# Patient Record
Sex: MALE | Race: WHITE | NOT HISPANIC OR LATINO | Employment: PART TIME | URBAN - METROPOLITAN AREA
[De-identification: names, ages, dates, MRNs, and addresses within clinical notes are randomized per-mention and may not be internally consistent; named-entity substitution may affect disease eponyms.]

---

## 2023-01-09 ENCOUNTER — OFFICE VISIT (OUTPATIENT)
Dept: PHYSICAL THERAPY | Facility: MEDICAL CENTER | Age: 23
End: 2023-01-09

## 2023-01-09 DIAGNOSIS — M54.6 THORACIC BACK PAIN, UNSPECIFIED BACK PAIN LATERALITY, UNSPECIFIED CHRONICITY: Primary | ICD-10-CM

## 2023-01-09 NOTE — PROGRESS NOTES
PT Evaluation     Today's date: 2023  Patient name: Niurka Forrest  : 2000  MRN: [id number]  Referring provider: Mina Frazier PT  Dx:   Encounter Diagnosis     ICD-10-CM    1  Thoracic back pain, unspecified back pain laterality, unspecified chronicity  M54 6                      Assessment  Assessment details: Pt is a pleasant 24 yo male presenting to physical therapy with thoracic pain  Pt would benefit from skilled PT to address current impairments and return pt to pre-morbid function  We had a long discussion about the patients non-orthopedic issues  He is going to try to work on improving his diet and hydration with electrolytes  If he continues feel heart palpitations or his heart is racing he is going to contact his PCP  Understanding of Dx/Px/POC: good   Prognosis: good    Goals  Impairment Goals  - Pt I with initial HEP in 1-2 visits  - Improve ROM equal to contralateral side in 4 weeks    Functional Goals  - Increase FOTO to at least 85 in 4 weeks  - Patient will be independent with comprehensive HEP in 4 weeks  - Patient will be able to return to pre-morbid activity level with min to no difficulty or discomfort in 4 weeks        Plan  Patient would benefit from: skilled physical therapy  Other planned modality interventions: Modalities prn  Planned therapy interventions: manual therapy, neuromuscular re-education, patient education, therapeutic activities, therapeutic exercise, stretching and strengthening  Frequency: 2x week  Duration in weeks: 4  Treatment plan discussed with: family and patient        Subjective Evaluation    History of Present Illness  Mechanism of injury: Pt with previous history of episodes of his heart racing when he was younger  He has had a halter monitor multiple times, but nothing ever registered  The two weeks ago he started to have some chest pain/tightness and his heart felt like it was fluttering    He went to the ER and he had a EKG, which didn't show anything  Pt states that he has not felt his heart racing, but he has felt his heart fluttering  Shortly after he had the chest pain, he started to have some thoracic pain  He will sometimes feel the thoracic pain with movement  Pain  Current pain ratin  At best pain ratin  At worst pain rating: 3    Social Support    Employment status: working  Hand dominance: right    Treatments  No previous or current treatments  Patient Goals  Patient goals for therapy: decreased pain          Objective     Concurrent Complaints  Positive for difficulty breathing   Negative for night pain, disturbed sleep, dizziness, faints, headaches, nausea/motion sickness, trouble swallowing and shortness of breath    Additional Special Questions  + occasional lump in the throat     Postural Observations    Additional Postural Observation Details  + scapular protraction, anterior tilting and winging B    Cervical/Thoracic Screen   Cervical range of motion within normal limits  Thoracic range of motion within normal limits  Thoracic range of motion within normal limits with the following exceptions: Decreased L rotation    Neurological Testing     Sensation     Shoulder   Left Shoulder   Intact: light touch    Right Shoulder   Intact: light touch    Active Range of Motion   Left Shoulder   Normal active range of motion    Right Shoulder   Normal active range of motion    Joint Play     Hypermobile: T3, T4, T5, T6 and T7   T3 comments: L>R  T4 comments: L>R  T5 comments: L>R  T6 comments: L>R  T7 comments: L>R    Strength/Myotome Testing     Left Shoulder     Planes of Motion   Flexion: 5   Abduction: 5   External rotation at 0°: 5   Internal rotation at 0°: 5     Right Shoulder     Planes of Motion   Flexion: 5   Abduction: 5   External rotation at 0°: 5   Internal rotation at 0°: 5     Additional Strength Details  Scaption and empty can: 5/5 B    Elbow flexion and extension: 5/5 B    Tests     Left Shoulder   Negative scapular relocation  Right Shoulder   Negative scapular relocation     Neuro Exam:     Headaches   Patient reports headaches: No               Precautions: None      Manuals 1/9            Thoracic mobs HK                                                   Ther Ex 1/9            Thoracic ext IP                                                                                                                                                                                                                                                                                             Modalities 1/9             15'

## 2023-01-26 ENCOUNTER — NEW PATIENT (OUTPATIENT)
Dept: URBAN - METROPOLITAN AREA CLINIC 6 | Facility: CLINIC | Age: 23
End: 2023-01-26

## 2023-01-26 DIAGNOSIS — Z01.00: ICD-10-CM

## 2023-01-26 PROCEDURE — 92015 DETERMINE REFRACTIVE STATE: CPT

## 2023-01-26 PROCEDURE — 92004 COMPRE OPH EXAM NEW PT 1/>: CPT

## 2023-01-26 ASSESSMENT — VISUAL ACUITY
OS_SC: 20/25+1
OS_SC: J1+
OD_SC: J1+
OD_SC: 20/20-1

## 2023-01-26 ASSESSMENT — TONOMETRY
OD_IOP_MMHG: 14
OS_IOP_MMHG: 14